# Patient Record
Sex: MALE | Race: WHITE | NOT HISPANIC OR LATINO | Employment: FULL TIME | URBAN - METROPOLITAN AREA
[De-identification: names, ages, dates, MRNs, and addresses within clinical notes are randomized per-mention and may not be internally consistent; named-entity substitution may affect disease eponyms.]

---

## 2017-08-13 ENCOUNTER — HOSPITAL ENCOUNTER (OUTPATIENT)
Facility: HOSPITAL | Age: 27
Setting detail: OBSERVATION
Discharge: ANOTHER HOSPITAL | End: 2017-08-14
Attending: EMERGENCY MEDICINE | Admitting: HOSPITALIST

## 2017-08-13 DIAGNOSIS — B17.10 ACUTE HEPATITIS C VIRUS INFECTION WITHOUT HEPATIC COMA: Primary | ICD-10-CM

## 2017-08-13 LAB
ALBUMIN SERPL-MCNC: 3.9 G/DL (ref 3.5–5.2)
ALBUMIN/GLOB SERPL: 1.4 G/DL
ALP SERPL-CCNC: 227 U/L (ref 40–129)
ALT SERPL W P-5'-P-CCNC: 2755 U/L (ref 5–41)
AMYLASE SERPL-CCNC: 47 U/L (ref 28–100)
ANION GAP SERPL CALCULATED.3IONS-SCNC: 12.5 MMOL/L
ANISOCYTOSIS BLD QL: ABNORMAL
AST SERPL-CCNC: 1015 U/L (ref 5–40)
BACTERIA UR QL AUTO: ABNORMAL /HPF
BASO STIPL COARSE BLD QL SMEAR: ABNORMAL
BILIRUB SERPL-MCNC: 6.7 MG/DL (ref 0.2–1.2)
BILIRUB UR QL STRIP: ABNORMAL
BUN BLD-MCNC: 8 MG/DL (ref 6–20)
BUN/CREAT SERPL: 8.6 (ref 7–25)
BURR CELLS BLD QL SMEAR: ABNORMAL
CALCIUM SPEC-SCNC: 9.1 MG/DL (ref 8.6–10.5)
CHLORIDE SERPL-SCNC: 91 MMOL/L (ref 98–107)
CK SERPL-CCNC: 24 U/L (ref 36–170)
CLARITY UR: ABNORMAL
CO2 SERPL-SCNC: 29.5 MMOL/L (ref 22–29)
COLOR UR: ABNORMAL
CREAT BLD-MCNC: 0.93 MG/DL (ref 0.76–1.27)
DEPRECATED RDW RBC AUTO: 42 FL (ref 37–54)
EOSINOPHIL # BLD MANUAL: 0.11 10*3/MM3 (ref 0.1–0.3)
EOSINOPHIL NFR BLD MANUAL: 1 % (ref 0–4)
ERYTHROCYTE [DISTWIDTH] IN BLOOD BY AUTOMATED COUNT: 14.2 % (ref 11.5–14.5)
GFR SERPL CREATININE-BSD FRML MDRD: 97 ML/MIN/1.73
GLOBULIN UR ELPH-MCNC: 2.7 GM/DL
GLUCOSE BLD-MCNC: 148 MG/DL (ref 65–99)
GLUCOSE UR STRIP-MCNC: ABNORMAL MG/DL
HCT VFR BLD AUTO: 51.5 % (ref 42–52)
HGB BLD-MCNC: 17.7 G/DL (ref 14–18)
HGB UR QL STRIP.AUTO: NEGATIVE
HYALINE CASTS UR QL AUTO: ABNORMAL /LPF
KETONES UR QL STRIP: ABNORMAL
LARGE PLATELETS: ABNORMAL
LDH SERPL-CCNC: 343 U/L (ref 135–225)
LEUKOCYTE ESTERASE UR QL STRIP.AUTO: NEGATIVE
LIPASE SERPL-CCNC: 30 U/L (ref 13–60)
LYMPHOCYTES # BLD MANUAL: 6.77 10*3/MM3 (ref 0.6–4.8)
LYMPHOCYTES NFR BLD MANUAL: 3 % (ref 3–8)
LYMPHOCYTES NFR BLD MANUAL: 60 % (ref 20–45)
MCH RBC QN AUTO: 28.4 PG (ref 27–31)
MCHC RBC AUTO-ENTMCNC: 34.4 G/DL (ref 31–37)
MCV RBC AUTO: 82.5 FL (ref 80–94)
MONOCYTES # BLD AUTO: 0.34 10*3/MM3 (ref 0–1)
MUCOUS THREADS URNS QL MICRO: ABNORMAL /HPF
MYOGLOBIN SERPL-MCNC: <21 NG/ML (ref 28–72)
NEUTROPHILS # BLD AUTO: 3.39 10*3/MM3 (ref 1.5–8.3)
NEUTROPHILS NFR BLD MANUAL: 30 % (ref 45–70)
NITRITE UR QL STRIP: NEGATIVE
PH UR STRIP.AUTO: 5.5 [PH] (ref 4.5–8)
PLATELET # BLD AUTO: 105 10*3/MM3 (ref 140–500)
PMV BLD AUTO: 11 FL (ref 7.4–10.4)
POLYCHROMASIA BLD QL SMEAR: ABNORMAL
POTASSIUM BLD-SCNC: 3.7 MMOL/L (ref 3.5–5.2)
PROT SERPL-MCNC: 6.6 G/DL (ref 6–8.5)
PROT UR QL STRIP: ABNORMAL
RBC # BLD AUTO: 6.24 10*6/MM3 (ref 4.7–6.1)
RBC # UR: ABNORMAL /HPF
REF LAB TEST METHOD: ABNORMAL
SCAN SLIDE: NORMAL
SMALL PLATELETS BLD QL SMEAR: ABNORMAL
SODIUM BLD-SCNC: 133 MMOL/L (ref 136–145)
SP GR UR STRIP: 1.02 (ref 1–1.03)
SQUAMOUS #/AREA URNS HPF: ABNORMAL /HPF
UROBILINOGEN UR QL STRIP: ABNORMAL
VARIANT LYMPHS NFR BLD MANUAL: 6 % (ref 0–5)
WBC MORPH BLD: NORMAL
WBC NRBC COR # BLD: 11.29 10*3/MM3 (ref 4.8–10.8)
WBC UR QL AUTO: ABNORMAL /HPF

## 2017-08-13 PROCEDURE — 81015 MICROSCOPIC EXAM OF URINE: CPT | Performed by: EMERGENCY MEDICINE

## 2017-08-13 PROCEDURE — 99284 EMERGENCY DEPT VISIT MOD MDM: CPT

## 2017-08-13 PROCEDURE — 82150 ASSAY OF AMYLASE: CPT | Performed by: EMERGENCY MEDICINE

## 2017-08-13 PROCEDURE — 85025 COMPLETE CBC W/AUTO DIFF WBC: CPT | Performed by: EMERGENCY MEDICINE

## 2017-08-13 PROCEDURE — 83690 ASSAY OF LIPASE: CPT | Performed by: EMERGENCY MEDICINE

## 2017-08-13 PROCEDURE — 99284 EMERGENCY DEPT VISIT MOD MDM: CPT | Performed by: EMERGENCY MEDICINE

## 2017-08-13 PROCEDURE — 81001 URINALYSIS AUTO W/SCOPE: CPT | Performed by: EMERGENCY MEDICINE

## 2017-08-13 PROCEDURE — 87086 URINE CULTURE/COLONY COUNT: CPT | Performed by: EMERGENCY MEDICINE

## 2017-08-13 PROCEDURE — 80074 ACUTE HEPATITIS PANEL: CPT | Performed by: EMERGENCY MEDICINE

## 2017-08-13 PROCEDURE — 85007 BL SMEAR W/DIFF WBC COUNT: CPT | Performed by: EMERGENCY MEDICINE

## 2017-08-13 PROCEDURE — 83615 LACTATE (LD) (LDH) ENZYME: CPT | Performed by: EMERGENCY MEDICINE

## 2017-08-13 PROCEDURE — 82550 ASSAY OF CK (CPK): CPT | Performed by: EMERGENCY MEDICINE

## 2017-08-13 PROCEDURE — 85730 THROMBOPLASTIN TIME PARTIAL: CPT | Performed by: EMERGENCY MEDICINE

## 2017-08-13 PROCEDURE — 83874 ASSAY OF MYOGLOBIN: CPT | Performed by: EMERGENCY MEDICINE

## 2017-08-13 PROCEDURE — 80053 COMPREHEN METABOLIC PANEL: CPT | Performed by: EMERGENCY MEDICINE

## 2017-08-13 PROCEDURE — 85610 PROTHROMBIN TIME: CPT | Performed by: EMERGENCY MEDICINE

## 2017-08-13 PROCEDURE — 85060 BLOOD SMEAR INTERPRETATION: CPT | Performed by: EMERGENCY MEDICINE

## 2017-08-13 RX ORDER — PROPRANOLOL HYDROCHLORIDE 40 MG/1
40 TABLET ORAL 2 TIMES DAILY
COMMUNITY
End: 2020-07-20

## 2017-08-13 RX ORDER — SODIUM CHLORIDE 0.9 % (FLUSH) 0.9 %
10 SYRINGE (ML) INJECTION AS NEEDED
Status: DISCONTINUED | OUTPATIENT
Start: 2017-08-13 | End: 2017-08-14 | Stop reason: HOSPADM

## 2017-08-14 ENCOUNTER — APPOINTMENT (OUTPATIENT)
Dept: ULTRASOUND IMAGING | Facility: HOSPITAL | Age: 27
End: 2017-08-14

## 2017-08-14 VITALS
WEIGHT: 170 LBS | HEART RATE: 66 BPM | SYSTOLIC BLOOD PRESSURE: 136 MMHG | OXYGEN SATURATION: 97 % | HEIGHT: 69 IN | BODY MASS INDEX: 25.18 KG/M2 | RESPIRATION RATE: 18 BRPM | DIASTOLIC BLOOD PRESSURE: 79 MMHG | TEMPERATURE: 97.8 F

## 2017-08-14 PROBLEM — B17.10 ACUTE HEPATITIS C VIRUS INFECTION WITHOUT HEPATIC COMA: Status: ACTIVE | Noted: 2017-08-14

## 2017-08-14 PROBLEM — B17.9 ACUTE VIRAL HEPATITIS: Status: ACTIVE | Noted: 2017-08-14

## 2017-08-14 LAB
ALBUMIN SERPL-MCNC: 3.6 G/DL (ref 3.5–5.2)
ALBUMIN/GLOB SERPL: 1.6 G/DL
ALP SERPL-CCNC: 197 U/L (ref 40–129)
ALT SERPL W P-5'-P-CCNC: 2524 U/L (ref 5–41)
AMMONIA BLD-SCNC: 43 UMOL/L (ref 16–60)
ANION GAP SERPL CALCULATED.3IONS-SCNC: 10.4 MMOL/L
APTT PPP: 35.4 SECONDS (ref 24.3–38.1)
AST SERPL-CCNC: 660 U/L (ref 5–40)
BILIRUB SERPL-MCNC: 5.8 MG/DL (ref 0.2–1.2)
BUN BLD-MCNC: 7 MG/DL (ref 6–20)
BUN/CREAT SERPL: 8 (ref 7–25)
CALCIUM SPEC-SCNC: 8.8 MG/DL (ref 8.6–10.5)
CHLORIDE SERPL-SCNC: 96 MMOL/L (ref 98–107)
CO2 SERPL-SCNC: 29.6 MMOL/L (ref 22–29)
CREAT BLD-MCNC: 0.87 MG/DL (ref 0.76–1.27)
GFR SERPL CREATININE-BSD FRML MDRD: 104 ML/MIN/1.73
GLOBULIN UR ELPH-MCNC: 2.3 GM/DL
GLUCOSE BLD-MCNC: 143 MG/DL (ref 65–99)
HAV IGM SERPL QL IA: ABNORMAL
HBV CORE IGM SERPL QL IA: ABNORMAL
HBV SURFACE AG SERPL QL IA: ABNORMAL
HCV AB SER DONR QL: REACTIVE
INR PPP: 1.49 (ref 0.9–1.1)
MAGNESIUM SERPL-MCNC: 2 MG/DL (ref 1.7–2.5)
POTASSIUM BLD-SCNC: 3.1 MMOL/L (ref 3.5–5.2)
PROT SERPL-MCNC: 5.9 G/DL (ref 6–8.5)
PROTHROMBIN TIME: 18.2 SECONDS (ref 12.1–15)
SODIUM BLD-SCNC: 136 MMOL/L (ref 136–145)
TSH SERPL DL<=0.05 MIU/L-ACNC: 1.41 MIU/ML (ref 0.27–4.2)

## 2017-08-14 PROCEDURE — 80053 COMPREHEN METABOLIC PANEL: CPT | Performed by: HOSPITALIST

## 2017-08-14 PROCEDURE — 84443 ASSAY THYROID STIM HORMONE: CPT | Performed by: NURSE PRACTITIONER

## 2017-08-14 PROCEDURE — 87522 HEPATITIS C REVRS TRNSCRPJ: CPT | Performed by: NURSE PRACTITIONER

## 2017-08-14 PROCEDURE — G0378 HOSPITAL OBSERVATION PER HR: HCPCS

## 2017-08-14 PROCEDURE — 99234 HOSP IP/OBS SM DT SF/LOW 45: CPT | Performed by: HOSPITALIST

## 2017-08-14 PROCEDURE — 82140 ASSAY OF AMMONIA: CPT | Performed by: NURSE PRACTITIONER

## 2017-08-14 PROCEDURE — 83735 ASSAY OF MAGNESIUM: CPT | Performed by: NURSE PRACTITIONER

## 2017-08-14 PROCEDURE — 87902 NFCT AGT GNTYP ALYS HEP C: CPT | Performed by: HOSPITALIST

## 2017-08-14 RX ORDER — SODIUM CHLORIDE 0.9 % (FLUSH) 0.9 %
10 SYRINGE (ML) INJECTION AS NEEDED
Start: 2017-08-14 | End: 2020-07-20

## 2017-08-14 RX ORDER — PROPRANOLOL HYDROCHLORIDE 40 MG/1
40 TABLET ORAL EVERY 12 HOURS SCHEDULED
Status: DISCONTINUED | OUTPATIENT
Start: 2017-08-14 | End: 2017-08-14 | Stop reason: HOSPADM

## 2017-08-14 RX ORDER — POTASSIUM CHLORIDE 20 MEQ/1
40 TABLET, EXTENDED RELEASE ORAL ONCE
Status: COMPLETED | OUTPATIENT
Start: 2017-08-14 | End: 2017-08-14

## 2017-08-14 RX ADMIN — POTASSIUM CHLORIDE 40 MEQ: 1500 TABLET, EXTENDED RELEASE ORAL at 07:50

## 2017-08-14 NOTE — ED NOTES
Lab is able to run PTT and PT/INR on blood sent earlier tonight.     Karrie Quintero RN  08/14/17 0030

## 2017-08-14 NOTE — ED NOTES
Face sheet faxed to [facility] registration ED     Mariola Hansen  08/13/17 9680

## 2017-08-14 NOTE — PLAN OF CARE
Problem: Patient Care Overview (Adult)  Goal: Plan of Care Review    08/14/17 0542   Coping/Psychosocial Response Interventions   Plan Of Care Reviewed With patient;significant other   Patient Care Overview   Progress no change   Outcome Evaluation   Outcome Summary/Follow up Plan Patient has rested for the majority of the shift. [facility] transfer center called and stated they may have a room early in the am. Kait @ [phone]

## 2017-08-14 NOTE — ED PROVIDER NOTES
"Subjective     History provided by:  Patient and significant other    History of Present Illness    · Chief complaint: \"Not felt right\"    · Location: The patient has intermittent pain on the top of his head, as well as in his lower back bilaterally that does not radiate, as well as in his knees    · Quality/Severity: He reports dizziness, headache, pain in his lower back, pain in his knees, and his eyes turning yellow, as well as nausea and vomiting    · Timing/Onset: The pain and dizziness and nausea and vomiting have all been intermittent for the last 9 days since he was released from [facility] in [place].  He states his eyes turn yellow 2 days ago.    · Modifying Factors: None    · Associated symptoms: As above    · Narrative: The patient is a 27-year-old white male who states Thursday a week ago he went to the [facility] medical clinic and received a shot of Vivitrol.  He states at that time the clinic notices blood pressure to be severely high.  The clinic gave him clonidine, blood pressure went even higher.  When his blood pressure was high he was confused and dazed.  He was transferred to [facility] in [place] were a stroke was ruled out his blood pressure brought under control.  He was Hospital last for 1 day.  He states since he was released from the hospital 8 days ago he has \"not felt right\".  He reports intermittent pain on the crown of his head that comes and goes.  He also reports intermittent bilateral low back pain that does not radiate.  He also complains of bilateral knee pain.  He's had intermittent nausea and vomited yesterday morning as well as once today.  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  The patient states that his eyes turn yellow 2 days ago.  The patient is a recovering IV drug abuser of heroin and has been clean for the last 2 months.  He states he had used IV heroin for 10 years.  The patient states that he has " "contracted hepatitis C in the past, but states that his recent blood work showed a \"nondetectable\".  He states he occasionally uses alcohol and last drank 2 days ago.  He also reports a urine spinning \"tea-colored dark\".    ED Triage Vitals   Temp Heart Rate Resp BP SpO2   08/13/17 2026 08/13/17 2026 08/13/17 2026 08/13/17 2026 08/13/17 2026   97.8 °F (36.6 °C) 75 20 143/91 98 %      Temp src Heart Rate Source Patient Position BP Location FiO2 (%)   08/13/17 2026 -- 08/13/17 2026 08/13/17 2026 --   Oral  Sitting Right arm        Review of Systems   Constitutional: Negative for activity change, appetite change, chills, diaphoresis, fatigue and fever.   HENT: Negative for congestion, dental problem, ear pain, hearing loss, mouth sores, postnasal drip, rhinorrhea, sinus pressure, sore throat, trouble swallowing and voice change.    Eyes: Negative for photophobia, pain, discharge, redness and visual disturbance.   Respiratory: Negative for cough, chest tightness, shortness of breath, wheezing and stridor.    Cardiovascular: Negative for chest pain, palpitations and leg swelling.   Gastrointestinal: Positive for nausea and vomiting. Negative for abdominal pain, blood in stool and diarrhea.   Endocrine: Negative for polydipsia and polyphagia.   Genitourinary: Negative for difficulty urinating, dysuria, flank pain, frequency, hematuria and urgency.        Dark urine   Musculoskeletal: Positive for arthralgias and back pain. Negative for gait problem, joint swelling, myalgias, neck pain and neck stiffness.   Skin: Negative for color change and rash.   Neurological: Positive for dizziness, light-headedness and headaches. Negative for tremors, seizures, syncope, facial asymmetry, speech difficulty, weakness and numbness.   Hematological: Negative for adenopathy.   Psychiatric/Behavioral: Negative.  Negative for confusion and decreased concentration. The patient is not nervous/anxious.        Past Medical History:   Diagnosis " Date   • Hypertension        No Known Allergies    History reviewed. No pertinent surgical history.    Family History   Problem Relation Age of Onset   • Cancer Mother        Social History     Social History   • Marital status: Single     Spouse name: N/A   • Number of children: N/A   • Years of education: N/A     Social History Main Topics   • Smoking status: Never Smoker   • Smokeless tobacco: Current User     Types: Snuff   • Alcohol use Yes      Comment: socially   • Drug use: No      Comment: hx of heroin use, currently on vivitrol   • Sexual activity: Yes     Other Topics Concern   • None     Social History Narrative           Objective   Physical Exam   Constitutional: He is oriented to person, place, and time. He appears well-developed and well-nourished. No distress.   HENT:   Head: Normocephalic and atraumatic.   Right Ear: External ear normal.   Left Ear: External ear normal.   Nose: Nose normal.   Mouth/Throat: Oropharynx is clear and moist. No oropharyngeal exudate.   Eyes: Conjunctivae and EOM are normal. Pupils are equal, round, and reactive to light. Right eye exhibits no discharge. Left eye exhibits no discharge. Scleral icterus is present.   Neck: Normal range of motion. Neck supple. No JVD present. No thyromegaly present.   Cardiovascular: Normal rate, regular rhythm and normal heart sounds.    No murmur heard.  Pulmonary/Chest: Effort normal and breath sounds normal. He has no wheezes. He has no rales. He exhibits no tenderness.   Abdominal: Soft. Bowel sounds are normal. He exhibits no distension. There is no tenderness.   Musculoskeletal: Normal range of motion. He exhibits no edema, tenderness or deformity.   Lymphadenopathy:     He has no cervical adenopathy.   Neurological: He is alert and oriented to person, place, and time. No cranial nerve deficit. Coordination normal.   No focal motor sensory deficit   Skin: Skin is warm and dry. No rash noted. He is not diaphoretic.   Psychiatric: He  has a normal mood and affect. His behavior is normal. Judgment and thought content normal.   Nursing note and vitals reviewed.      Procedures         ED Course  ED Course   Comment By Time   Scott Report [id number] is blank. Mateus Lyles MD 08/13 2125   The patient was initially discussed with Dr. Luciano, hospitalist, who recommended the patient be transferred to [facility] in the care of a Dr. Sean Saba.  I spoke with Dr. Joe, GI fellow at [facility], who stated Dr. Wright was not on this week, and that he could not take the patient [facility], but he could take the patient at [facility].  The [place] [address] [facility] was contacted and patient information sent, but due to no beds available at [facility], the access center stated he would not be getting a bed tonight as they were other patients already waiting for beds.  The patient was then rediscussed with Dr. Luciano who agreed to admit the patient here to observation to initiate workup for the patient's acute hepatitis. Mateus Lyles MD 08/14 0030                  MDM  Number of Diagnoses or Management Options  Acute hepatitis C virus infection without hepatic coma: new and requires workup     Amount and/or Complexity of Data Reviewed  Clinical lab tests: ordered and reviewed  Discuss the patient with other providers: yes    Risk of Complications, Morbidity, and/or Mortality  Presenting problems: high  Diagnostic procedures: high  Management options: high    Patient Progress  Patient progress: stable      Final diagnoses:   Acute hepatitis C virus infection without hepatic coma           Labs Reviewed   COMPREHENSIVE METABOLIC PANEL - Abnormal; Notable for the following:        Result Value    Glucose 148 (*)     Sodium 133 (*)     Chloride 91 (*)     CO2 29.5 (*)     ALT (SGPT) 2755 (*)     AST (SGOT) 1015 (*)     Alkaline Phosphatase 227 (*)     Total Bilirubin 6.7 (*)     All other components within normal limits   URINALYSIS W/ CULTURE  IF INDICATED - Abnormal; Notable for the following:     Color, UA Yoly (*)     Appearance, UA Slightly Cloudy (*)     Glucose,  mg/dL (Trace) (*)     Ketones, UA Trace (*)     Bilirubin, UA Large (3+) (*)     Protein, UA 30 mg/dL (1+) (*)     Urobilinogen, UA >=8.0 E.U./dL (*)     All other components within normal limits   CBC WITH AUTO DIFFERENTIAL - Abnormal; Notable for the following:     WBC 11.29 (*)     RBC 6.24 (*)     MPV 11.0 (*)     Platelets 105 (*)     All other components within normal limits   LACTATE DEHYDROGENASE - Abnormal; Notable for the following:      (*)     All other components within normal limits   HEPATITIS PANEL, ACUTE - Abnormal; Notable for the following:     Hepatitis C Ab Reactive (*)     All other components within normal limits   URINALYSIS, MICROSCOPIC ONLY - Abnormal; Notable for the following:     RBC, UA 0-2 (*)     WBC, UA 13-20 (*)     Bacteria, UA 1+ (*)     Squamous Epithelial Cells, UA 13-20 (*)     Mucus, UA Small/1+ (*)     All other components within normal limits   CK - Abnormal; Notable for the following:     Creatine Kinase 24 (*)     All other components within normal limits   MYOGLOBIN, SERUM - Abnormal; Notable for the following:     Myoglobin <21.0 (*)     All other components within normal limits   PROTIME-INR - Abnormal; Notable for the following:     Protime 18.2 (*)     INR 1.49 (*)     All other components within normal limits    Narrative:     Therapeutic Ranges for INR: 2.0-3.0 (PT 20-30)                              2.5-3.5 (PT 25-34)   MANUAL DIFFERENTIAL - Abnormal; Notable for the following:     Neutrophil % 30.0 (*)     Lymphocyte % 60.0 (*)     Atypical Lymphocyte % 6.0 (*)     Lymphocytes Absolute 6.77 (*)     All other components within normal limits   AMYLASE - Normal   LIPASE - Normal   APTT - Normal    Narrative:     PTT = The equivalent PTT values for the therapeutic range of heparin levels at 0.1 to 0.7 U/ml are 53 to 110 seconds.    URINE CULTURE   SCAN SLIDE   SLIDE REVIEW, HEMATOLOGY   CBC AND DIFFERENTIAL    Narrative:     The following orders were created for panel order CBC & Differential.  Procedure                               Abnormality         Status                     ---------                               -----------         ------                     Manual Differential[[id number]]          Abnormal            Final result               Scan Slide[[id number]]                                       Final result               CBC Auto Differential[[id number]]        Abnormal            Final result               Slide Review, Hematology[[id number]]                         In process                   Please view results for these tests on the individual orders.     No orders to display          Medication List      Notice     No changes were made to your prescriptions during this visit.             Mateus Lyles MD  08/14/17 0124

## 2017-08-14 NOTE — ED NOTES
Pt sts Dr Sean Osborne is accepting physician at [facility].     Karrie Quintero, ADRIANA  08/14/17 0020

## 2017-08-14 NOTE — DISCHARGE SUMMARY
"Discharge Summary and H and P/  Transfer to [facility]      Kale Gonzalez  1990  [id number]        Hospitalists Discharge Summary    Date of Admission: 8/13/2017  Date of Discharge:  8/14/2017     PER ER Physician:    The patient is a 27-year-old white male who states Thursday a week ago he went to the [facility] and received a shot of Vivitrol.  He states at that time the clinic notices blood pressure to be severely high.  The clinic gave him clonidine, blood pressure went even higher.  When his blood pressure was high he was confused and dazed.  He was transferred to [facility] in [place] were a stroke was ruled out his blood pressure brought under control.  He was in the Hospital  for 1 day.  He states since he was released from the hospital 8 days ago he has \"not felt right\".  He reports intermittent pain on the crown of his head that comes and goes.  He also reports intermittent bilateral low back pain that does not radiate.  He also complains of bilateral knee pain.  He's had intermittent nausea and vomited yesterday morning as well as once today.  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  The patient states that his eyes turn yellow 2 days ago.  The patient is a recovering IV drug abuser of heroin and has been clean for the last 2 months.  He states he had used IV heroin for 10 years.  The patient states that he has contracted hepatitis C in the past, but states that his recent blood work showed a \"nondetectable\".  He states he occasionally uses alcohol and last drank 2 days ago.  He also reports a urine as \"tea-colored dark\".    [facility] Report [id number] is blank. Mateus Lyles MD 08/13 2125   The patient was initially discussed with Dr. Luciano, hospitalist, who recommended the patient be transferred to [place] in the care of a Dr. Sean Saba.  I spoke with Dr. Joe, GI fellow at [place], who stated Dr. Wright was not on this " week, and that he could not take the patient [facility], but he could take the patient at [facility].  The [place] [address] [facility] was contacted and patient information sent, but due to no beds available at [facility], the access center stated he would not be getting a bed tonight as they were other patients already waiting for beds.  The patient was then rediscussed with Dr. Luciano who agreed to admit the patient here to observation to initiate workup for the patient's acute hepatitis. Mateus Lyles MD 08/14 0030      Per Dr. Sloan (Morning Call Physician)   A bed is now available and the patient will be trasferred this morning to [facility]        Primary Discharge Diagnoses and Secondary Discharge Diagnoses:     Jaundice  ?Hepatotoxicity from vivitrol  ?Acute hepatitis C virus infection without hepatic coma?   H/O hep c with last lab negative or nondetectable.  Hypertension/ controlled at this time  History of heroin abuse  Tobacco abuse  Alcohol Use              PCP  Patient Care Team:  No Known Provider as PCP - General    Consults:   Consults     Date and Time Order Name Status Description    8/14/2017 0718 Inpatient Consult to Gastroenterology            Operations and Procedures Performed:       No results found.    Allergies:  has No Known Allergies.    Scott  Reviewed in the ER/  See above    Discharge Medications:   Kale Gonzalez   Home Medication Instructions VASQUEZ:[id number]    Printed on:08/14/17 0833   Medication Information                      Naltrexone (VIVITROL IM)  Inject 1 application into the shoulder, thigh, or buttocks Every 30 (Thirty) Days. ? dose             propranolol (INDERAL) 40 MG tablet  Take 40 mg by mouth 2 (Two) Times a Day.             sodium chloride 0.9 % flush  Infuse 10 mL into a venous catheter As Needed for Line Care.                 History of Present Illness:  Patient came to hospital with jaundice and nausea    Hospital Course   Patient  diagnosed with impressive elevated liver enzymes and jaundice. Patient to be observed in our hospital until patient can get a bed at [facility] which became available this morning.    Last Lab Results:   Lab Results (most recent)     Procedure Component Value Units Date/Time    Urinalysis, Microscopic Only [[id number]]  (Abnormal) Collected:  08/13/17 2101    Specimen:  Urine from Urine, Clean Catch Updated:  08/13/17 2129     RBC, UA 0-2 (A) /HPF      WBC, UA 13-20 (A) /HPF      Bacteria, UA 1+ (A) /HPF      Squamous Epithelial Cells, UA 13-20 (A) /HPF      Hyaline Casts, UA None Seen /LPF      Mucus, UA Small/1+ (A) /HPF      Methodology Manual Light Microscopy    Amylase [[id number]]  (Normal) Collected:  08/13/17 2101    Specimen:  Blood Updated:  08/13/17 2139     Amylase 47 U/L     Lipase [[id number]]  (Normal) Collected:  08/13/17 2101    Specimen:  Blood Updated:  08/13/17 2139     Lipase 30 U/L     Lactate Dehydrogenase [[id number]]  (Abnormal) Collected:  08/13/17 2101    Specimen:  Blood Updated:  08/13/17 2139      (H) U/L     Urinalysis With / Culture If Indicated [[id number]]  (Abnormal) Collected:  08/13/17 2101    Specimen:  Urine from Urine, Clean Catch Updated:  08/13/17 2143     Color, UA Yoly (A)     Appearance, UA Slightly Cloudy (A)     pH, UA 5.5     Specific Gravity, UA 1.020     Glucose,  mg/dL (Trace) (A)     Ketones, UA Trace (A)     Bilirubin, UA Large (3+) (A)     Blood, UA Negative     Protein, UA 30 mg/dL (1+) (A)     Leuk Esterase, UA Negative     Nitrite, UA Negative     Urobilinogen, UA >=8.0 E.U./dL (A)    Comprehensive Metabolic Panel [[id number]]  (Abnormal) Collected:  08/13/17 2101    Specimen:  Blood Updated:  08/13/17 2149     Glucose 148 (H) mg/dL      BUN 8 mg/dL      Creatinine 0.93 mg/dL      Sodium 133 (L) mmol/L      Potassium 3.7 mmol/L      Chloride 91 (L) mmol/L      CO2 29.5 (H) mmol/L      Calcium 9.1 mg/dL      Total Protein 6.6 g/dL      Albumin 3.90  g/dL      ALT (SGPT) 2755 (H) U/L      AST (SGOT) 1015 (H) U/L      Alkaline Phosphatase 227 (H) U/L      Total Bilirubin 6.7 (H) mg/dL      eGFR Non African Amer 97 mL/min/1.73      Globulin 2.7 gm/dL      A/G Ratio 1.4 g/dL      BUN/Creatinine Ratio 8.6     Anion Gap 12.5 mmol/L     Slide Review, Hematology [[id number]] Collected:  08/13/17 2101    Specimen:  Blood Updated:  08/13/17 2156    CBC & Differential [[id number]] Collected:  08/13/17 2101    Specimen:  Blood Updated:  08/13/17 2157    Narrative:       The following orders were created for panel order CBC & Differential.  Procedure                               Abnormality         Status                     ---------                               -----------         ------                     Manual Differential[[id number]]          Abnormal            Final result               Scan Slide[[id number]]                                       Final result               CBC Auto Differential[[id number]]        Abnormal            Final result               Slide Review, Hematology[[id number]]                         In process                   Please view results for these tests on the individual orders.    CBC Auto Differential [[id number]]  (Abnormal) Collected:  08/13/17 2101    Specimen:  Blood Updated:  08/13/17 2157     WBC 11.29 (H) 10*3/mm3      RBC 6.24 (H) 10*6/mm3      Hemoglobin 17.7 g/dL      Hematocrit 51.5 %      MCV 82.5 fL      MCH 28.4 pg      MCHC 34.4 g/dL      RDW 14.2 %      RDW-SD 42.0 fl      MPV 11.0 (H) fL      Platelets 105 (L) 10*3/mm3     Scan Slide [[id number]] Collected:  08/13/17 2101    Specimen:  Blood Updated:  08/13/17 2157     Scan Slide --      See Manual Differential Results       Manual Differential [[id number]]  (Abnormal) Collected:  08/13/17 2101    Specimen:  Blood Updated:  08/13/17 2157     Neutrophil % 30.0 (L) %      Lymphocyte % 60.0 (H) %      Monocyte % 3.0 %      Eosinophil % 1.0 %      Atypical Lymphocyte % 6.0 (H)  %      Neutrophils Absolute 3.39 10*3/mm3      Lymphocytes Absolute 6.77 (H) 10*3/mm3      Monocytes Absolute 0.34 10*3/mm3      Eosinophils Absolute 0.11 10*3/mm3      Anisocytosis Slight/1+     Basophilic Stippling Slight/1+     Crenated RBC's Slight/1+     Polychromasia Slight/1+     WBC Morphology Normal     Platelet Estimate Decreased     Large Platelets Slight/1+    CK [[id number]]  (Abnormal) Collected:  08/13/17 2127    Specimen:  Blood Updated:  08/13/17 2214     Creatine Kinase 24 (L) U/L     Myoglobin, Serum [[id number]]  (Abnormal) Collected:  08/13/17 2127    Specimen:  Blood Updated:  08/13/17 2214     Myoglobin <21.0 (L) ng/mL     Protime-INR [[id number]]  (Abnormal) Collected:  08/13/17 2101    Specimen:  Blood Updated:  08/14/17 0038     Protime 18.2 (H) Seconds      INR 1.49 (H)    Narrative:       Therapeutic Ranges for INR: 2.0-3.0 (PT 20-30)                              2.5-3.5 (PT 25-34)    Hepatitis Panel, Acute [[id number]]  (Abnormal) Collected:  08/13/17 2120    Specimen:  Blood Updated:  08/14/17 0049     Hepatitis B Surface Ag Non-Reactive     Hep A IgM Non-Reactive     Hep B C IgM Non-Reactive     Hepatitis C Ab Reactive (C)    aPTT [[id number]]  (Normal) Collected:  08/13/17 2100    Specimen:  Blood Updated:  08/14/17 0058     PTT 35.4 seconds     Narrative:       PTT = The equivalent PTT values for the therapeutic range of heparin levels at 0.1 to 0.7 U/ml are 53 to 110 seconds.    Hepatitis C Genotype [[id number]] Collected:  08/14/17 0404    Specimen:  Blood Updated:  08/14/17 0407    Comprehensive Metabolic Panel [[id number]]  (Abnormal) Collected:  08/14/17 0404    Specimen:  Blood Updated:  08/14/17 0454     Glucose 143 (H) mg/dL      BUN 7 mg/dL      Creatinine 0.87 mg/dL      Sodium 136 mmol/L      Potassium 3.1 (L) mmol/L      Chloride 96 (L) mmol/L      CO2 29.6 (H) mmol/L      Calcium 8.8 mg/dL      Total Protein 5.9 (L) g/dL      Albumin 3.60 g/dL      ALT (SGPT) 2524 (H) U/L       AST (SGOT) 660 (H) U/L      Alkaline Phosphatase 197 (H) U/L      Total Bilirubin 5.8 (H) mg/dL      eGFR Non African Amer 104 mL/min/1.73      Globulin 2.3 gm/dL      A/G Ratio 1.6 g/dL      BUN/Creatinine Ratio 8.0     Anion Gap 10.4 mmol/L     Urine Culture [[id number]]  (Normal) Collected:  08/13/17 2101    Specimen:  Urine from Urine, Clean Catch Updated:  08/14/17 0737     Urine Culture Culture in progress        Imaging Results (most recent)     None          PROCEDURES      Condition on Discharge:  Stable      Physical Exam at Discharge  Vital Signs  Temp:  [97 °F (36.1 °C)-98.1 °F (36.7 °C)] 97.8 °F (36.6 °C)  Heart Rate:  [63-75] 66  Resp:  [18-20] 18  BP: (136-154)/(79-93) 136/79    Physical Exam:  Physical Exam   Constitutional: Patient appears well-developed and well-nourished and in no acute distress   However, he is obviously yellow all over/  HEENT:   Head: Normocephalic and atraumatic.   Eyes:  Pupils are equal, round, and reactive to light. EOM are intact. Scleral icterus is present  Mouth and Throat: Patient has moist mucous membranes. Oropharynx is clear of any erythema or exudate.     Neck: Neck supple. No JVD present. No thyromegaly present. No lymphadenopathy present.  Cardiovascular: Regular rate, regular rhythm, S1 normal and S2 normal.  Exam reveals no gallop and no friction rub.  No murmur heard.  Pulmonary/Chest: Lungs are clear to auscultation bilaterally. No respiratory distress. No wheezes. No rhonchi. No rales.   Abdominal: Soft. Bowel sounds are normal. No distension and no mass. There is no hepatosplenomegaly. There is no tenderness.   Musculoskeletal: Normal Muscle tone  Extremities: No edema. Pulses are palpable in all 4 extremities.  Neurological: Patient is alert and oriented to person, place, and time. Cranial nerves II-XII are grossly intact with no focal deficits.  Skin: Skin is warm. No rash noted. Nails show no clubbing.  Patient is jaundiced.  Significant tattoos chest and  arms.    Discharge Disposition  [facility]    Visiting Nurse:    No     Home PT/OT:  No     Home Safety Evaluation:  No     DME  None    Discharge Diet:           Dietary Orders            Start     Ordered    08/14/17 0159  Diet Regular  Diet Effective Now     Question:  Diet Texture / Consistency  Answer:  Regular    08/14/17 0200          Activity at Discharge:  As tolerated    Pre-discharge education  None      Follow-up Appointments  No future appointments.      Test Results Pending at Discharge   Order Current Status    CBC & Differential In process    Hepatitis C Genotype In process    Hepatitis C RNA, Quantitative, PCR (graph) In process    Slide Review, Hematology In process    Urine Culture Preliminary result           Dixie Sloan DO  08/14/17  8:33 AM    Time: 20 min (if over 30 minutes give explanation as to why it took greater than 30 minutes)

## 2017-08-15 LAB
BACTERIA SPEC AEROBE CULT: NO GROWTH
CYTOLOGIST CVX/VAG CYTO: NORMAL
PATH INTERP BLD-IMP: NORMAL

## 2017-08-16 LAB
HCV RNA SERPL NAA+PROBE-ACNC: 9290 IU/ML
HCV RNA SERPL NAA+PROBE-LOG IU: 3.97 LOG10 IU/ML
TEST INFORMATION: NORMAL

## 2017-08-19 LAB
HCV GENTYP SERPL NAA+PROBE: NORMAL
Lab: NORMAL
SPECIMEN STATUS: NORMAL

## 2021-04-30 ENCOUNTER — TRANSCRIBE ORDERS (OUTPATIENT)
Dept: ADMINISTRATIVE | Facility: HOSPITAL | Age: 31
End: 2021-04-30

## 2021-04-30 DIAGNOSIS — F11.29 OPIOID DEPENDENCE WITH OPIOID-INDUCED DISORDER (HCC): Primary | ICD-10-CM

## 2021-05-01 ENCOUNTER — LAB (OUTPATIENT)
Dept: LAB | Facility: HOSPITAL | Age: 31
End: 2021-05-01

## 2021-05-01 DIAGNOSIS — F11.29 OPIOID DEPENDENCE WITH OPIOID-INDUCED DISORDER (HCC): ICD-10-CM

## 2021-05-01 LAB
ALBUMIN SERPL-MCNC: 4.8 G/DL (ref 3.5–5.2)
ALBUMIN/GLOB SERPL: 2.1 G/DL
ALP SERPL-CCNC: 62 U/L (ref 39–117)
ALT SERPL W P-5'-P-CCNC: 15 U/L (ref 1–41)
ANION GAP SERPL CALCULATED.3IONS-SCNC: 12.4 MMOL/L (ref 5–15)
AST SERPL-CCNC: 20 U/L (ref 1–40)
BASOPHILS # BLD AUTO: 0.05 10*3/MM3 (ref 0–0.2)
BASOPHILS NFR BLD AUTO: 0.6 % (ref 0–1.5)
BILIRUB SERPL-MCNC: 0.6 MG/DL (ref 0–1.2)
BUN SERPL-MCNC: 15 MG/DL (ref 6–20)
BUN/CREAT SERPL: 16.1 (ref 7–25)
CALCIUM SPEC-SCNC: 9.5 MG/DL (ref 8.6–10.5)
CHLORIDE SERPL-SCNC: 101 MMOL/L (ref 98–107)
CO2 SERPL-SCNC: 25.6 MMOL/L (ref 22–29)
CREAT SERPL-MCNC: 0.93 MG/DL (ref 0.76–1.27)
DEPRECATED RDW RBC AUTO: 39.2 FL (ref 37–54)
EOSINOPHIL # BLD AUTO: 0.46 10*3/MM3 (ref 0–0.4)
EOSINOPHIL NFR BLD AUTO: 5.4 % (ref 0.3–6.2)
ERYTHROCYTE [DISTWIDTH] IN BLOOD BY AUTOMATED COUNT: 12.2 % (ref 12.3–15.4)
GFR SERPL CREATININE-BSD FRML MDRD: 95 ML/MIN/1.73
GLOBULIN UR ELPH-MCNC: 2.3 GM/DL
GLUCOSE SERPL-MCNC: 80 MG/DL (ref 65–99)
HCT VFR BLD AUTO: 43.5 % (ref 37.5–51)
HGB BLD-MCNC: 15.1 G/DL (ref 13–17.7)
HIV1+2 AB SER QL: NORMAL
IMM GRANULOCYTES # BLD AUTO: 0.03 10*3/MM3 (ref 0–0.05)
IMM GRANULOCYTES NFR BLD AUTO: 0.3 % (ref 0–0.5)
LYMPHOCYTES # BLD AUTO: 2.09 10*3/MM3 (ref 0.7–3.1)
LYMPHOCYTES NFR BLD AUTO: 24.3 % (ref 19.6–45.3)
MCH RBC QN AUTO: 30.6 PG (ref 26.6–33)
MCHC RBC AUTO-ENTMCNC: 34.7 G/DL (ref 31.5–35.7)
MCV RBC AUTO: 88.2 FL (ref 79–97)
MONOCYTES # BLD AUTO: 0.82 10*3/MM3 (ref 0.1–0.9)
MONOCYTES NFR BLD AUTO: 9.5 % (ref 5–12)
NEUTROPHILS NFR BLD AUTO: 5.14 10*3/MM3 (ref 1.7–7)
NEUTROPHILS NFR BLD AUTO: 59.9 % (ref 42.7–76)
NRBC BLD AUTO-RTO: 0 /100 WBC (ref 0–0.2)
PLATELET # BLD AUTO: 254 10*3/MM3 (ref 140–450)
PMV BLD AUTO: 10.7 FL (ref 6–12)
POTASSIUM SERPL-SCNC: 4.2 MMOL/L (ref 3.5–5.2)
PROT SERPL-MCNC: 7.1 G/DL (ref 6–8.5)
RBC # BLD AUTO: 4.93 10*6/MM3 (ref 4.14–5.8)
SODIUM SERPL-SCNC: 139 MMOL/L (ref 136–145)
WBC # BLD AUTO: 8.59 10*3/MM3 (ref 3.4–10.8)

## 2021-05-01 PROCEDURE — 86803 HEPATITIS C AB TEST: CPT

## 2021-05-01 PROCEDURE — 80053 COMPREHEN METABOLIC PANEL: CPT

## 2021-05-01 PROCEDURE — 85025 COMPLETE CBC W/AUTO DIFF WBC: CPT

## 2021-05-01 PROCEDURE — 87522 HEPATITIS C REVRS TRNSCRPJ: CPT

## 2021-05-01 PROCEDURE — G0432 EIA HIV-1/HIV-2 SCREEN: HCPCS

## 2021-05-01 PROCEDURE — 36415 COLL VENOUS BLD VENIPUNCTURE: CPT

## 2021-05-04 LAB
DIAGNOSTIC IMP SPEC-IMP: NORMAL
HCV AB S/CO SERPL IA: >11 S/CO RATIO (ref 0–0.9)
HCV RNA SERPL NAA+PROBE-ACNC: NORMAL IU/ML
REF LAB TEST REF RANGE: NORMAL

## 2023-04-11 ENCOUNTER — HOSPITAL ENCOUNTER (EMERGENCY)
Facility: HOSPITAL | Age: 33
Discharge: HOME | End: 2023-04-11
Attending: EMERGENCY MEDICINE | Admitting: EMERGENCY MEDICINE
Payer: COMMERCIAL

## 2023-04-11 ENCOUNTER — APPOINTMENT (OUTPATIENT)
Dept: GENERAL RADIOLOGY | Facility: HOSPITAL | Age: 33
End: 2023-04-11
Payer: COMMERCIAL

## 2023-04-11 VITALS
TEMPERATURE: 98.1 F | SYSTOLIC BLOOD PRESSURE: 136 MMHG | BODY MASS INDEX: 27.4 KG/M2 | OXYGEN SATURATION: 93 % | DIASTOLIC BLOOD PRESSURE: 89 MMHG | HEIGHT: 69 IN | RESPIRATION RATE: 16 BRPM | WEIGHT: 185 LBS | HEART RATE: 79 BPM

## 2023-04-11 DIAGNOSIS — R10.13 EPIGASTRIC PAIN: ICD-10-CM

## 2023-04-11 DIAGNOSIS — R11.2 NAUSEA AND VOMITING, UNSPECIFIED VOMITING TYPE: Primary | ICD-10-CM

## 2023-04-11 LAB
ALBUMIN SERPL-MCNC: 5.2 G/DL (ref 3.5–5.2)
ALBUMIN/GLOB SERPL: 1.7 G/DL
ALP SERPL-CCNC: 81 U/L (ref 39–117)
ALT SERPL W P-5'-P-CCNC: 13 U/L (ref 1–41)
ANION GAP SERPL CALCULATED.3IONS-SCNC: 11.8 MMOL/L (ref 5–15)
AST SERPL-CCNC: 21 U/L (ref 1–40)
BASOPHILS # BLD AUTO: 0.02 10*3/MM3 (ref 0–0.2)
BASOPHILS NFR BLD AUTO: 0.2 % (ref 0–1.5)
BILIRUB SERPL-MCNC: 1.1 MG/DL (ref 0–1.2)
BUN SERPL-MCNC: 19 MG/DL (ref 6–20)
BUN/CREAT SERPL: 19 (ref 7–25)
CALCIUM SPEC-SCNC: 10.1 MG/DL (ref 8.6–10.5)
CHLORIDE SERPL-SCNC: 94 MMOL/L (ref 98–107)
CO2 SERPL-SCNC: 27.2 MMOL/L (ref 22–29)
CREAT SERPL-MCNC: 1 MG/DL (ref 0.76–1.27)
DEPRECATED RDW RBC AUTO: 37.4 FL (ref 37–54)
EGFRCR SERPLBLD CKD-EPI 2021: 101.9 ML/MIN/1.73
EOSINOPHIL # BLD AUTO: 0.03 10*3/MM3 (ref 0–0.4)
EOSINOPHIL NFR BLD AUTO: 0.3 % (ref 0.3–6.2)
ERYTHROCYTE [DISTWIDTH] IN BLOOD BY AUTOMATED COUNT: 12 % (ref 12.3–15.4)
GLOBULIN UR ELPH-MCNC: 3 GM/DL
GLUCOSE SERPL-MCNC: 109 MG/DL (ref 65–99)
HCT VFR BLD AUTO: 47.2 % (ref 37.5–51)
HGB BLD-MCNC: 16.7 G/DL (ref 13–17.7)
IMM GRANULOCYTES # BLD AUTO: 0.03 10*3/MM3 (ref 0–0.05)
IMM GRANULOCYTES NFR BLD AUTO: 0.3 % (ref 0–0.5)
LIPASE SERPL-CCNC: 19 U/L (ref 13–60)
LYMPHOCYTES # BLD AUTO: 0.48 10*3/MM3 (ref 0.7–3.1)
LYMPHOCYTES NFR BLD AUTO: 4.3 % (ref 19.6–45.3)
MCH RBC QN AUTO: 30.3 PG (ref 26.6–33)
MCHC RBC AUTO-ENTMCNC: 35.4 G/DL (ref 31.5–35.7)
MCV RBC AUTO: 85.5 FL (ref 79–97)
MONOCYTES # BLD AUTO: 0.65 10*3/MM3 (ref 0.1–0.9)
MONOCYTES NFR BLD AUTO: 5.8 % (ref 5–12)
NEUTROPHILS NFR BLD AUTO: 10.02 10*3/MM3 (ref 1.7–7)
NEUTROPHILS NFR BLD AUTO: 89.1 % (ref 42.7–76)
NRBC BLD AUTO-RTO: 0 /100 WBC (ref 0–0.2)
PLATELET # BLD AUTO: 224 10*3/MM3 (ref 140–450)
PMV BLD AUTO: 10.2 FL (ref 6–12)
POTASSIUM SERPL-SCNC: 3.9 MMOL/L (ref 3.5–5.2)
PROT SERPL-MCNC: 8.2 G/DL (ref 6–8.5)
QT INTERVAL: 350 MS
RBC # BLD AUTO: 5.52 10*6/MM3 (ref 4.14–5.8)
SODIUM SERPL-SCNC: 133 MMOL/L (ref 136–145)
TROPONIN T SERPL HS-MCNC: <6 NG/L
WBC NRBC COR # BLD: 11.23 10*3/MM3 (ref 3.4–10.8)

## 2023-04-11 PROCEDURE — 25010000002 ONDANSETRON PER 1 MG: Performed by: EMERGENCY MEDICINE

## 2023-04-11 PROCEDURE — 96374 THER/PROPH/DIAG INJ IV PUSH: CPT

## 2023-04-11 PROCEDURE — 93010 ELECTROCARDIOGRAM REPORT: CPT | Performed by: INTERNAL MEDICINE

## 2023-04-11 PROCEDURE — 80053 COMPREHEN METABOLIC PANEL: CPT | Performed by: EMERGENCY MEDICINE

## 2023-04-11 PROCEDURE — 83690 ASSAY OF LIPASE: CPT | Performed by: EMERGENCY MEDICINE

## 2023-04-11 PROCEDURE — 99283 EMERGENCY DEPT VISIT LOW MDM: CPT

## 2023-04-11 PROCEDURE — 84484 ASSAY OF TROPONIN QUANT: CPT | Performed by: EMERGENCY MEDICINE

## 2023-04-11 PROCEDURE — 36415 COLL VENOUS BLD VENIPUNCTURE: CPT

## 2023-04-11 PROCEDURE — 71045 X-RAY EXAM CHEST 1 VIEW: CPT

## 2023-04-11 PROCEDURE — 85025 COMPLETE CBC W/AUTO DIFF WBC: CPT | Performed by: EMERGENCY MEDICINE

## 2023-04-11 PROCEDURE — 93005 ELECTROCARDIOGRAM TRACING: CPT | Performed by: EMERGENCY MEDICINE

## 2023-04-11 RX ORDER — LIDOCAINE HYDROCHLORIDE 20 MG/ML
15 SOLUTION OROPHARYNGEAL ONCE
Status: COMPLETED | OUTPATIENT
Start: 2023-04-11 | End: 2023-04-11

## 2023-04-11 RX ORDER — SODIUM CHLORIDE 0.9 % (FLUSH) 0.9 %
10 SYRINGE (ML) INJECTION AS NEEDED
Status: DISCONTINUED | OUTPATIENT
Start: 2023-04-11 | End: 2023-04-11 | Stop reason: HOSPADM

## 2023-04-11 RX ORDER — ONDANSETRON 4 MG/1
4 TABLET, ORALLY DISINTEGRATING ORAL EVERY 8 HOURS PRN
Qty: 20 TABLET | Refills: 0 | Status: SHIPPED | OUTPATIENT
Start: 2023-04-11

## 2023-04-11 RX ORDER — ALUMINA, MAGNESIA, AND SIMETHICONE 2400; 2400; 240 MG/30ML; MG/30ML; MG/30ML
15 SUSPENSION ORAL ONCE
Status: COMPLETED | OUTPATIENT
Start: 2023-04-11 | End: 2023-04-11

## 2023-04-11 RX ORDER — PANTOPRAZOLE SODIUM 40 MG/1
40 TABLET, DELAYED RELEASE ORAL DAILY
Qty: 30 TABLET | Refills: 0 | Status: SHIPPED | OUTPATIENT
Start: 2023-04-11

## 2023-04-11 RX ORDER — ONDANSETRON 2 MG/ML
4 INJECTION INTRAMUSCULAR; INTRAVENOUS ONCE
Status: COMPLETED | OUTPATIENT
Start: 2023-04-11 | End: 2023-04-11

## 2023-04-11 RX ADMIN — ALUMINUM HYDROXIDE, MAGNESIUM HYDROXIDE, AND DIMETHICONE 15 ML: 400; 400; 40 SUSPENSION ORAL at 08:57

## 2023-04-11 RX ADMIN — LIDOCAINE HYDROCHLORIDE 15 ML: 20 SOLUTION ORAL; TOPICAL at 08:57

## 2023-04-11 RX ADMIN — ONDANSETRON 4 MG: 2 INJECTION INTRAMUSCULAR; INTRAVENOUS at 08:57

## 2023-04-11 NOTE — ED PROVIDER NOTES
Subjective   History of Present Illness  Patient presents complaining of chest pain that started last night.  Patient said he ate some [employer] yesterday and he thinks it did not sit right.  Patient said last night he started having some indigestion and got sick to his stomach and threw up a few times.  Patient said when he woke up this morning he was feeling the same way and got sick again.  Patient Nuys any blood in his emesis and says after the food came up he just started having dry heaves.  Patient said he also would feel episodes of shortness of breath when he was getting sick.  Patient denies any history of exertional chest pain in the past.  Patient does not smoke.  Patient does not daily drink about 6-8 beers.  Patient only has 1 cup of coffee in the morning.  Patient denies any long distance travel or trauma.  No sick contacts that he is aware of.  No therapy taken last night or prior to arrival.        Review of Systems   All other systems reviewed and are negative.      Past Medical History:   Diagnosis Date   • Hypertension        No Known Allergies    No past surgical history on file.    Family History   Problem Relation Age of Onset   • Cancer Mother        Social History     Socioeconomic History   • Marital status:    Tobacco Use   • Smoking status: Never   • Smokeless tobacco: Current     Types: Snuff   Substance and Sexual Activity   • Alcohol use: Yes     Comment: socially   • Drug use: No     Comment: hx of heroin use, currently on vivitrol   • Sexual activity: Yes           Objective   Physical Exam  Vitals and nursing note reviewed.   HENT:      Head: Normocephalic.   Eyes:      Conjunctiva/sclera: Conjunctivae normal.   Cardiovascular:      Rate and Rhythm: Normal rate and regular rhythm.      Pulses:           Radial pulses are 2+ on the right side and 2+ on the left side.        Dorsalis pedis pulses are 2+ on the right side and 2+ on the left side.        Posterior tibial pulses  are 2+ on the right side and 2+ on the left side.      Heart sounds: Normal heart sounds.   Pulmonary:      Effort: Pulmonary effort is normal.      Breath sounds: Normal breath sounds.   Chest:      Chest wall: No tenderness or crepitus.   Abdominal:      Palpations: Abdomen is soft.   Musculoskeletal:      Right lower leg: No edema.      Left lower leg: No edema.   Skin:     General: Skin is warm and dry.      Capillary Refill: Capillary refill takes 2 to 3 seconds.   Neurological:      Mental Status: He is alert and oriented to person, place, and time.   Psychiatric:         Mood and Affect: Mood normal.         Behavior: Behavior normal.         ECG 12 Lead      Date/Time: 4/11/2023 8:25 AM  Performed by: Jordan Wong MD  Authorized by: Jordan Wong MD   Interpreted by physician  Comparison: not compared with previous ECG   Previous ECG: no previous ECG available  Comments: Rate of 88, sinus rhythm, normal axis, no acute ST elevation or depression.                 ED Course                                           Medical Decision Making  ddx ACS, pleurisy, costochondritis, GERD, viral syndrome, gastritis, hiatal hernia, pancreatitis, cholecystitis    XR Chest 1 View    Result Date: 4/11/2023  Negative chest.  This report was finalized on 4/11/2023 9:54 AM by Dr. Gordon Llamas MD.      Labs Reviewed  COMPREHENSIVE METABOLIC PANEL - Abnormal; Notable for the following components:     Glucose                       109 (*)                Sodium                        133 (*)                Chloride                      94 (*)              All other components within normal limits         Narrative: GFR Normal >60                  Chronic Kidney Disease <60                  Kidney Failure <15                    CBC WITH AUTO DIFFERENTIAL - Abnormal; Notable for the following components:     WBC                           11.23 (*)               RDW                           12.0 (*)                Neutrophil %                  89.1 (*)               Lymphocyte %                  4.3 (*)                Neutrophils, Absolute         10.02 (*)               Lymphocytes, Absolute         0.48 (*)            All other components within normal limits  TROPONIN - Normal         Narrative: High Sensitive Troponin T Reference Range:                  <10.0 ng/L- Negative Female for AMI                  <15.0 ng/L- Negative Male for AMI                  >=10 - Abnormal Female indicating possible myocardial injury.                  >=15 - Abnormal Male indicating possible myocardial injury.                   Clinicians would have to utilize clinical acumen, EKG, Troponin, and serial changes to determine if it is an Acute Myocardial Infarction or myocardial injury due to an underlying chronic condition.                                       LIPASE - Normal  HIGH SENSITIVITIY TROPONIN T 2HR  CBC AND DIFFERENTIAL    1010 Pt seen again prior to d/c.  Labs/Imaging reviewed and are unremarkable.  Vitals stable and pt. in NAD.  Patient was advised to restart his antihypertensive medications and to get a PCP for further evaluation of any long-term problems.  Non-toxic. Comfortable. Ambulating without difficulty.  Tolerating po.  Relaxed breathing.  All questions personally answered at the bedside and all d/c instructions personally reviewed with pt.  Discussed the importance of close outpt. f/u and pt. understands this and agrees to do so.  Pt agrees to return to ED immediately for any new, persistent, or worsening symptoms.    EMR Dragon/Transcription disclaimer:  Much of this encounter note is an electronic transcription/translation of spoken language to printed text, aka voice recognition.  The electronic translation of spoken language may permit erroneous or at times nonsensical words or phrases to be inadvertently transcribed; although I have reviewed the note for such errors, some may still exist so please interpret based  on surrounding text content.      Amount and/or Complexity of Data Reviewed  Labs: ordered.  Radiology: ordered.  ECG/medicine tests: ordered and independent interpretation performed.      Risk  OTC drugs.  Prescription drug management.          Final diagnoses:   Nausea and vomiting, unspecified vomiting type   Epigastric pain       ED Disposition  ED Disposition     ED Disposition   Discharge    Condition   Stable    Comment   --             PATIENT CONNECTION - [place] [address]  [phone]  In 3 days           Medication List      New Prescriptions    ondansetron ODT 4 MG disintegrating tablet  Commonly known as: ZOFRAN-ODT  Place 1 tablet on the tongue Every 8 (Eight) Hours As Needed for Nausea or Vomiting.     pantoprazole 40 MG EC tablet  Commonly known as: PROTONIX  Take 1 tablet by mouth Daily.           Where to Get Your Medications      These medications were sent to [id number] - [address] [place] [address] - [phone]  - [phone] [address] [address]    Phone: [phone]   · ondansetron ODT 4 MG disintegrating tablet  · pantoprazole 40 MG EC tablet          Jordan Wong MD  04/11/23 6967

## 2023-04-11 NOTE — Clinical Note
[place]  [facility] EMERGENCY DEPARTMENT  [address]  [place] [address]  Phone: [phone]    Kale Gonzalez was seen and treated in our emergency department on 4/11/2023.  He may return to work on 04/12/2023.         Thank you for choosing [facility].    Jordan Wong MD